# Patient Record
(demographics unavailable — no encounter records)

---

## 2017-02-15 NOTE — EMERGENCY DEPARTMENT REPORT
HPI





- General


Chief Complaint: Dyspnea/Respdistress


Time Seen by Provider: 02/15/17 08:46





- HPI


HPI: 





The patient's 27-year-old female who presents for evaluation of dyspnea.  The 

patient has a history of asthma.  The patient reports progressive and constant 

dyspnea since yesterday morning, 25 hours ago, severe since last night, and is 

exacerbated with lying flat, exertion, or physical activity.  The patient 

denies fever, chest pain, CP, hemoptysis, unilateral leg swelling, oral 

contraceptive use, recent immobilization, history of DVT or PE, hx cancer.








ED Past Medical Hx





- Past Medical History


Hx Hypertension: No


Hx Heart Attack/AMI: No


Hx Congestive Heart Failure: No


Hx Diabetes: Yes (since age 16 ())


Hx Deep Vein Thrombosis: No


Hx Liver Disease: No


Hx Renal Disease: No


Hx Sickle Cell Disease: No


Hx Seizures: No


Hx Asthma: Yes (last attack about 4 months ago)


Hx COPD: No


Hx HIV: No





- Surgical History


Past Surgical History?: Yes


Additional Surgical History: 





- Social History


Smoking Status: Current Every Day Smoker


Substance Use Type: None





- Medications


Home Medications: 


 Home Medications











 Medication  Instructions  Recorded  Confirmed  Last Taken  Type


 


ALBUTEROL Inhaler [ProAir HFA 2 puff IH QID PRN #1 inhalation 02/15/17  Unknown 

Rx





Inhaler]     


 


Prednisone [predniSONE 10 mg 10 mg PO .TAPER #1 tab.ds.pk 02/15/17  Unknown Rx





(6-Day Pack, 21 Tabs)]     














ED Review of Systems


ROS: 


Stated complaint: DIFFICULTY BREATHING


Other details as noted in HPI








Constitutional: denies: fever


ENT: denies: throat or neck pain


Respiratory: reports shortness of breath


Cardiovascular: denies: chest pain


Endocrine: denies unexplained weight loss or gain


Gastrointestinal: denies: abdominal pain, nausea


Genitourinary: denies: dysuria


Musculoskeletal: denies: leg swelling


Skin: denies: rash


Neurological: denies: headache


Hematological/Lymphatic: denies: easy bleeding or easy bruising  


Psych: denies sadness or hopelessness











Physical Exam





- Physical Exam


Vital Signs: 


 Vital Signs











  02/15/17 02/15/17 02/15/17





  00:27 00:51 07:36


 


Temperature 98.4 F  


 


Pulse Rate 103 H  90


 


Pulse Rate [  94 H 





Throughout]   


 


Respiratory 22  18





Rate   


 


Respiratory  20 





Rate [   





Throughout]   


 


Blood Pressure 109/75  


 


Blood Pressure   120/76





[Right]   


 


O2 Sat by Pulse 99  100





Oximetry   














  02/15/17 02/15/17





  08:12 08:15


 


Temperature  


 


Pulse Rate 74 


 


Pulse Rate [  93 H





Throughout]  


 


Respiratory 18 





Rate  


 


Respiratory  18





Rate [  





Throughout]  


 


Blood Pressure  


 


Blood Pressure 110/67 





[Right]  


 


O2 Sat by Pulse 98 





Oximetry  











Physical Exam: 








General: well-nourished, well-developed, no acute distress


Head: Normocephalic, atraumatic


Eyes: normal sclera


ENT: Mucous membranes are pink and moist 


Neck: trachea midline, neck supple, No neck stiffness, no cervical adenopathy


Respiratory: Diminished breath sounds and wheezing present throughout lung 

fields bilaterally, no costal retractions, no respiratory distress


Cardio: S1 and S2 present, no murmurs, rubs, gallops, capillary refill is brisk


Abdomen: Normoactive bowel sounds, soft abdomen, no rigidity, no guarding or 

rebound tenderness


Chest WALL/Back: No tenderness to palpation of the chest wall, no CVA 

tenderness with percussion


Musc: No pitting edema


Skin: No rash


Neuro: no facial drooping, normal speech


Psych: Normal affect








ED Course


 Vital Signs











  02/15/17 02/15/17 02/15/17





  00:27 00:51 07:36


 


Temperature 98.4 F  


 


Pulse Rate 103 H  90


 


Pulse Rate [  94 H 





Throughout]   


 


Respiratory 22  18





Rate   


 


Respiratory  20 





Rate [   





Throughout]   


 


Blood Pressure 109/75  


 


Blood Pressure   120/76





[Right]   


 


O2 Sat by Pulse 99  100





Oximetry   














  02/15/17 02/15/17





  08:12 08:15


 


Temperature  


 


Pulse Rate 74 


 


Pulse Rate [  93 H





Throughout]  


 


Respiratory 18 





Rate  


 


Respiratory  18





Rate [  





Throughout]  


 


Blood Pressure  


 


Blood Pressure 110/67 





[Right]  


 


O2 Sat by Pulse 98 





Oximetry  














ED Medical Decision Making





- Medical Decision Making





The patient was seen and examined by myself.  The patient is placed on a 

cardiac monitor and continuous pulse ox. On initial evaluation, the patient was 

found to be in no distress. Evaluation orders were placed.  The patient is 

given multiple duoneb breathing treatments, IV magnesium, and IV Decadron for 

treatment of asthma.





The patient was reevaluated and reported that their symptoms were improved.  On 

reexamination the patient is found to have normal respiratory rate, O2 sat on 

pulse oximetry, with no costal retractions, and minimal wheezing on 

auscultation.  The patient is stable for discharge with outpatient follow-up.  

The patient is given follow-up and return instructions.  The patient expressed 

understanding and agreed with the plan.  The patient is discharged in stable 

condition.


Critical care attestation.: 


If time is entered above; I have spent that time in minutes in the direct care 

of this critically ill patient, excluding procedure time.








ED Disposition


Clinical Impression: 


 Asthma with acute exacerbation in adult





Disposition: DISCHARGED TO HOME OR SELFCARE


Is pt being admited?: No


Does the pt Need Aspirin: No


Condition: Stable


Instructions:  Asthma (ED)


Prescriptions: 


ALBUTEROL Inhaler [ProAir HFA Inhaler] 2 puff IH QID PRN #1 inhalation


 PRN Reason: Shortness Of Breath


Prednisone [predniSONE 10 mg (6-Day Pack, 21 Tabs)] 10 mg PO .TAPER #1 tab.maría


Referrals: 


PRIMARY CARE,MD [Primary Care Provider] - 3-5 Days


Time of Disposition: 09:33

## 2018-04-27 NOTE — XRAY REPORT
FINAL REPORT



EXAM:  XR CHEST ROUTINE 2V



HISTORY:  Shortness of breath 



TECHNIQUE:  PA and lateral views of the chest were submitted.



FINDINGS:  

The heart size and mediastinum appear normal. The lungs are

clear. Pleural fluid is not seen. The bones soft tissues do not

show any acute changes.



IMPRESSION:  

No active chest disease.

## 2018-04-27 NOTE — EMERGENCY DEPARTMENT REPORT
ED Chest Pain HPI





- General


Chief Complaint: Adult Asthma


Stated Complaint: ELYSIA


Time Seen by Provider: 18 03:02


Source: patient


Mode of arrival: Stretcher


Limitations: No Limitations





- History of Present Illness


Initial Comments: 





Ms. Cordova is a 28-year-old female with history of asthma.  Since Thursday 

morning she has had sharp stabbing chest pain.  Chest pain became worse after 

running from a pit bull in her neighborhood.  Chest pain has been present for a 

total of 12+ hours.  Sharp stabbing central chest pain.  No change with 

movement or inspiration.  No fever.  No wheezing.  No cough.  She uses an 

inhaler without any relief.  Gradual onset of chest pain.  Has been persistent.





No tobacco abuse.  No use of oral contraceptives.





- Related Data


 Previous Rx's











 Medication  Instructions  Recorded  Last Taken  Type


 


ALBUTEROL Inhaler [ProAir HFA 2 puff IH QID PRN #1 inhalation 02/15/17 Unknown 

Rx





Inhaler]    


 


Prednisone [predniSONE 10 mg 10 mg PO .TAPER #1 tab.ds.pk 02/15/17 Unknown Rx





(6-Day Pack, 21 Tabs)]    


 


ALBUTEROL Inhaler [ProAir HFA 2 puff IH QID PRN #1 inhalation 18 Unknown 

Rx





Inhaler]    


 


HYDROcodone/APAP 5-325 [[place] 1 each PO Q6HR PRN #10 tablet 18 Unknown Rx





5/325]    


 


Prednisone [predniSONE 10 mg 10 mg PO .TAPER #1 tab.ds.pk 18 Unknown Rx





(6-Day Pack, 21 Tabs)]    











 Allergies











Allergy/AdvReac Type Severity Reaction Status Date / Time


 


codeine Allergy  Hives Verified 07/25/15 19:38














Heart Score





- HEART Score


History: Slightly suspicious


EKG: Normal


Age: < 45


Risk factors: No known risk factors


Troponin: < normal limit


HEART Score: 0





ED Review of Systems


ROS: 


Stated complaint: ELYSIA


Other details as noted in HPI





Comment: All other systems reviewed and negative


Constitutional: denies: fever, malaise


Respiratory: denies: cough


Cardiovascular: chest pain





ED Past Medical Hx





- Past Medical History


Hx Hypertension: No


Hx Heart Attack/AMI: No


Hx Congestive Heart Failure: No


Hx Diabetes: Yes (since age 16 ())


Hx Deep Vein Thrombosis: No


Hx Liver Disease: No


Hx Renal Disease: No


Hx Sickle Cell Disease: No


Hx Seizures: No


Hx Asthma: Yes (last attack about 4 months ago)


Hx COPD: No


Hx HIV: No





- Surgical History


Additional Surgical History: , Ectopic Pregnancy





- Social History


Smoking Status: Never Smoker


Substance Use Type: None





- Medications


Home Medications: 


 Home Medications











 Medication  Instructions  Recorded  Confirmed  Last Taken  Type


 


ALBUTEROL Inhaler [ProAir HFA 2 puff IH QID PRN #1 inhalation 02/15/17  Unknown 

Rx





Inhaler]     


 


Prednisone [predniSONE 10 mg 10 mg PO .TAPER #1 tab.ds.pk 02/15/17  Unknown Rx





(6-Day Pack, 21 Tabs)]     


 


ALBUTEROL Inhaler [ProAir HFA 2 puff IH QID PRN #1 inhalation 18  Unknown 

Rx





Inhaler]     


 


HYDROcodone/APAP 5-325 [[place] 1 each PO Q6HR PRN #10 tablet 18  Unknown Rx





5/325]     


 


Prednisone [predniSONE 10 mg 10 mg PO .TAPER #1 tab.ds.pk 18  Unknown Rx





(6-Day Pack, 21 Tabs)]     














ED Physical Exam





- General


Limitations: No Limitations


General appearance: alert, in no apparent distress





- Head


Head exam: Present: atraumatic, normocephalic





- Eye


Eye exam: Present: normal appearance





- ENT


ENT exam: Present: mucous membranes moist





- Neck


Neck exam: Present: normal inspection





- Respiratory


Respiratory exam: Present: normal lung sounds bilaterally.  Absent: respiratory 

distress, wheezes, rales, rhonchi





- Cardiovascular


Cardiovascular Exam: Present: regular rate, normal rhythm, normal heart sounds.

  Absent: systolic murmur, diastolic murmur, rubs, gallop





- GI/Abdominal


GI/Abdominal exam: Present: soft, normal bowel sounds.  Absent: distended, 

tenderness, guarding, rebound (patient appears comfortable laying on her left 

side.)





- Extremities Exam


Extremities exam: Present: normal inspection





- Back Exam


Back exam: Present: normal inspection





- Neurological Exam


Neurological exam: Present: alert, oriented X3





- Psychiatric


Psychiatric exam: Present: normal affect, normal mood





- Skin


Skin exam: Present: warm, dry, intact, normal color.  Absent: rash





ED Course





 Vital Signs











  18





  00:23


 


Temperature 97.5 F L


 


Pulse Rate 75


 


Respiratory 18





Rate 


 


Blood Pressure 119/70


 


O2 Sat by Pulse 100





Oximetry 














ED Medical Decision Making





- Lab Data


Result diagrams: 


 18 01:09





 18 01:09








 Laboratory Results - last 24 hr











  18





  01:09 01:09 01:09


 


WBC    7.6


 


RBC    5.23 H


 


Hgb    9.6 L


 


Hct    32.0


 


MCV    64 L


 


MCH    18 L


 


MCHC    29 L


 


RDW    18.7 H


 


Plt Count    245


 


Sodium  140  


 


Potassium  3.6  


 


Chloride  101.3  


 


Carbon Dioxide  23  


 


Anion Gap  19  


 


BUN  14  


 


Creatinine  0.7  


 


Estimated GFR  > 60  


 


BUN/Creatinine Ratio  20  


 


Glucose  104 H  


 


Calcium  9.0  


 


Troponin T  < 0.010  


 


HCG, Qual   Negative 











 Vital Signs - 24 hr











  18





  00:23


 


Temperature 97.5 F L


 


Pulse Rate 75


 


Respiratory 18





Rate 


 


Blood Pressure 119/70


 


O2 Sat by Pulse 100





Oximetry 














- Radiology Data


Radiology results: report reviewed





- Medical Decision Making





Ms. Cordova presents with chest pain shortness of breath.  She is PERC negative.  

No indication of pericarditis.  No indication for pneumothorax.  I suspect 

acute asthma exacerbation is causing pain.  Prescribed albuterol prednisone 

[place].


She was given return precautions.





EKG interpretation


NSR nl rate nl axis nl intervals no ST-T signs of ischemia no ST elevation time 

obtained 2224 rate 80 beats a minute


Critical care attestation.: 


If time is entered above; I have spent that time in minutes in the direct care 

of this critically ill patient, excluding procedure time.








ED Disposition


Clinical Impression: 


 Acute asthma exacerbation, Chest pain





Disposition: - TO HOME OR SELFCARE


Is pt being admited?: No


Does the pt Need Aspirin: No


Condition: Stable


Instructions:  Chest Pain (ED), Asthma (ED)


Prescriptions: 


ALBUTEROL Inhaler [ProAir HFA Inhaler] 2 puff IH QID PRN #1 inhalation


 PRN Reason: Shortness Of Breath


HYDROcodone/APAP 5-325 [[place] 5/325] 1 each PO Q6HR PRN #10 tablet


 PRN Reason: Pain


Prednisone [predniSONE 10 mg (6-Day Pack, 21 Tabs)] 10 mg PO .TAPER #1 tab.ds.pk


Referrals: 


[facility] [Outside] - 3-5 Days


Forms:  Work/School Release Form(ED)


Time of Disposition: 03:29

## 2020-09-01 NOTE — EMERGENCY DEPARTMENT REPORT
ED Chest Pain HPI





- General


Chief Complaint: Chest Pain


Stated Complaint: CP


Time Seen by Provider: 20 06:27


Source: patient, EMS


Mode of arrival: Stretcher


Limitations: No Limitations





- History of Present Illness


Initial Comments: 





This is a 30-year-old -American female presents to the emergency 

department with complaint of midsternal chest tightness and shortness of breath 

that started about 30 minutes to 1 hour prior to arrival.  The patient says that

she was sitting on her bed when the symptoms began.  She denies any fever, 

nausea, vomiting, coughing, wheezing.  The chest tightness is nonradiating.  It 

is worse with inhalation and with touching her chest.  The patient was recently 

diagnosed with a DVT in the right leg and says that she is on Coumadin and has 

been compliant with her medication.  Her primary care physician is Dr. Veras.  

She denies any tobacco or illicit drug use.  She denies any other past medical 

history but appears to have a history of anemia.  She has not taken anything for

symptoms prior to presentation today.





- Related Data


                                Home Medications











 Medication  Instructions  Recorded  Confirmed  Last Taken


 


Insulin NPH Human Isophane 14 units SQ QHS 18 21:15





[Humulin N]    


 


Insulin Regular, Human [Humulin R] 10 units SQ QAMDIAB 18 08:30


 


Pnv No.95/Ferrous Fum/Folic AC 1 tab PO QDAY 18 08:00





[Prenatal Vitamins Tablet]    








                                  Previous Rx's











 Medication  Instructions  Recorded  Last Taken  Type


 


Albuterol Mdi (or & Nicu Only) 2 puff IH QID PRN #1 inhalation 02/15/17 4 Months

Ago Rx





[ProAir HFA Inhaler]   ~18 


 


Ferrous Sulfate [Feosol 325 MG tab] 325 mg PO BID #60 tablet 18 Unknown Rx


 


HYDROcodone/APAP 5-325 [[place] 1 each PO Q6HR PRN #30 tablet 18 Unknown Rx





5/325]    


 


Ibuprofen [Motrin] 800 mg PO Q8HR PRN #30 tablet 18 Unknown Rx


 


Pnv No.95/Ferrous Fum/Folic AC 1 each PO DAILY #30 tablet 18 Unknown Rx





[Prenavite Tablet]    


 


Sennosides/Docusate [Senokot S] 1 each PO Q12HR #14 tab 19 Unknown Rx


 


Apixaban [Eliquis starter pack] 5 mg PO BID #74 tab.ds.pk 20 Unknown Rx











                                    Allergies











Allergy/AdvReac Type Severity Reaction Status Date / Time


 


codeine Allergy  Hives Verified 07/25/15 19:38














Heart Score





- HEART Score


History: Slightly suspicious


EKG: Normal


Age: < 45


Risk factors: 1-2 risk factors


Troponin: < normal limit


HEART Score: 1





- Critical Actions


Critical Actions: 0-3 pts:0.9-1.7%risk of adverse cardiac event.Candidate for 

discharge





ED Review of Systems


ROS: 


Stated complaint: CP


Other details as noted in HPI





Comment: All other systems reviewed and negative


Constitutional: denies: chills, fever


Eyes: denies: eye pain, vision change


ENT: denies: ear pain, throat pain


Respiratory: shortness of breath.  denies: cough


Cardiovascular: chest pain.  denies: palpitations


Gastrointestinal: denies: abdominal pain, vomiting


Genitourinary: denies: dysuria, discharge


Musculoskeletal: denies: back pain, arthralgia


Skin: denies: rash, lesions


Neurological: denies: headache, weakness





ED Past Medical Hx





- Past Medical History


Previous Medical History?: Yes


Hx Hypertension: No


Hx Heart Attack/AMI: No


Hx Congestive Heart Failure: No


Hx Diabetes: Yes


Hx Deep Vein Thrombosis: Yes (right knee)


Hx Liver Disease: No


Hx Renal Disease: No


Hx Sickle Cell Disease: No


Hx Seizures: No


Hx Asthma: Yes (more seasonal)


Hx COPD: No


Hx HIV: No


Additional medical history: anemia





- Surgical History


Past Surgical History?: Yes


Additional Surgical History:  x2.  , Ectopic Pregnancy





- Social History


Smoking Status: Never Smoker


Substance Use Type: None





- Medications


Home Medications: 


                                Home Medications











 Medication  Instructions  Recorded  Confirmed  Last Taken  Type


 


Albuterol Mdi (or & Nicu Only) 2 puff IH QID PRN #1 inhalation 02/15/17 12/25/18

4 Months Ago Rx





[ProAir HFA Inhaler]    ~18 


 


Ferrous Sulfate [Feosol 325 MG tab] 325 mg PO BID #60 tablet 18  Unknown 

Rx


 


HYDROcodone/APAP 5-325 [[place] 1 each PO Q6HR PRN #30 tablet 18  Unknown Rx





5/325]     


 


Ibuprofen [Motrin] 800 mg PO Q8HR PRN #30 tablet 18  Unknown Rx


 


Insulin NPH Human Isophane 14 units SQ QHS 18 21:15 

History





[Humulin N]     


 


Insulin Regular, Human [Humulin R] 10 units SQ QAMDIAB 18 08:30 History


 


Pnv No.95/Ferrous Fum/Folic AC 1 tab PO QDAY 18 08:00 

History





[Prenatal Vitamins Tablet]     


 


Pnv No.95/Ferrous Fum/Folic AC 1 each PO DAILY #30 tablet 18  Unknown Rx





[Prenavite Tablet]     


 


Sennosides/Docusate [Senokot S] 1 each PO Q12HR #14 tab 19  Unknown Rx


 


Apixaban [Eliquis starter pack] 5 mg PO BID #74 tab.ds.pk 20  Unknown Rx














ED Physical Exam





- General


Limitations: No Limitations





- Other


Other exam information: 





GENERAL: The patient is well-developed well-nourished.


HENT: Normocephalic.  Atraumatic.    Patient has moist mucous membranes.


EYES: Extraocular motions are intact.


NECK: Supple. Trachea is midline.


CHEST/LUNGS: Clear to auscultation.  There is no respiratory distress noted.  

There is some reproducible tenderness to palpation to the midsternal chest wall.

 No crepitus or deformity.


HEART/CARDIOVASCULAR: Regular.  There is no tachycardia.  There is no murmur.


ABDOMEN: Abdomen is soft, nontender.  Patient has normal bowel sounds.  Obese 

habitus.


SKIN: Skin is warm and dry. 


NEURO: The patient is awake, alert, and oriented.  The patient is cooperative. 

Normal speech.


MUSCULOSKELETAL: There is no tenderness or deformity.  There is no limitation 

range of motion.  





ED Course


                                   Vital Signs











  20





  05:10 05:15 05:30


 


Temperature 98.2 F 98.6 F 


 


Pulse Rate 96 H 96 H 95 H


 


Respiratory 18 13 15





Rate   


 


Blood Pressure 109/71  105/61


 


Blood Pressure  104/71 





[Left]   


 


O2 Sat by Pulse 99 99 98





Oximetry   














  20





  06:00 06:30 07:00


 


Temperature   


 


Pulse Rate 127 H 80 78


 


Respiratory 14 18 17





Rate   


 


Blood Pressure 102/69 103/55 92/47


 


Blood Pressure   





[Left]   


 


O2 Sat by Pulse 100 96 





Oximetry   














  20





  07:15 07:30


 


Temperature  


 


Pulse Rate 72 77


 


Respiratory 16 17





Rate  


 


Blood Pressure 98/53 95/53


 


Blood Pressure  





[Left]  


 


O2 Sat by Pulse  





Oximetry  














MYRTLE score





- Myrtle Score


Age > 65: (0) No


Aspirin use within the Past 7 Days: (0) No


3 or more CAD Risk Factors: (0) No


2 or more Angina events in past 24 hrs: (1) Yes


Known CAD with more than 50% Stenosis: (0) No


Elevated Cardiac Markers: (0) No


ST Deviation Greater than 0.5mm: (0) No


MYRTLE Score: 1





ED Medical Decision Making





- Lab Data


Result diagrams: 


                                 20 05:29





                                 20 05:29





- EKG Data


-: EKG Interpreted by Me


EKG shows normal: sinus rhythm, axis, intervals, QRS complexes, ST-T waves


Rate: normal





- EKG Data


When compared to previous EKG there are: previous EKG unavailable


Interpretation: normal EKG





- Radiology Data


Radiology results: report reviewed





CTA CHEST WITH CONTRAST INDICATION / CLINICAL INFORMATION: CP, hx of recent DVT,

 not anticoagulated. TECHNIQUE: Axial CT images were obtained through the chest 

after injection of IV contrast. 3 plane MIP and/or 3D reconstructions were 

produced. All CT scans at this location are performed using CT dose reduction 

for ALARA by means of automated exposure control. COMPARISON: Radiograph from 

earlier today. FINDINGS: PULMONARY ARTERIES: No pulmonary emboli. There is 

enlargement of the main pulmonary artery, which measures up to 3.4 cm in 

diameter. THORACIC AORTA: No significant abnormality. HEART: No significant 

abnormality. CORONARY ARTERIES: No significant calcification. MEDIASTINUM / 

MICHAEL: No significant abnormality. PLEURA: No pleural effusion. No pneumothorax. 

LUNGS: No acute air space or interstitial disease. ADDITIONAL FINDINGS: None. 

UPPER ABDOMEN: No acute findings. SKELETAL STRUCTURES: No significant osseous 

abnormality. IMPRESSION: 1. No CT evidence for pulmonary embolism. 2. 

Enlargement of the main pulmonary artery, measuring up to 3.4 cm in diameter. 3.

 No acute findings. 





- Medical Decision Making





This patient presented to the emergency department with complaint of shortness 

of breath and some chest discomfort this morning while sitting on her bed.  On 

examination heart and lungs are normal to auscultation and the patient does not 

appear in any respiratory or acute distress.  EKG did not have any morphology 

consistent with ST elevation MI or any dysrhythmia.  Chest x-ray did not show 

any pneumonia, pleural effusions, pneumothorax, focal consolidation, or any 

other acute process.  Patient's labs have been unremarkable including CBC, 

metabolic panel and negative troponins x2.  However the patient is allegedly on 

Coumadin but has an INR of 1.  With her recent diagnosis of DVT, the 

subtherapeutic INR, and current complaints of chest pain and shortness of 

breath, a CT angiography of the chest was performed.  It resulted as negative 

for any pulmonary embolism or any other acute process.  The patient was started 

on Eliquis instead of the Coumadin.  She was given a coupon to make this 

medication cheaper for her for the first month.  She was instructed to 

discontinue the Coumadin.  She will follow-up with her primary care physician 

regarding anticoagulation and for general follow-up.  Her contact information 

has been sent over to the [facility], and someone from 

their office should be contacting her shortly for close outpatient follow-up as 

per our hospitals low risk chest pain protocol.


Critical Care Time: No


Critical care attestation.: 


If time is entered above; I have spent that time in minutes in the direct care 

of this critically ill patient, excluding procedure time.








ED Disposition


Clinical Impression: 


 Atypical chest pain, History of DVT (deep vein thrombosis), Subtherapeutic 

international normalized ratio (INR)





Disposition: DC- TO HOME OR SELFCARE


Is pt being admited?: No


Condition: Stable


Instructions:  Apixaban (By mouth), Chest Pain (ED)


Additional Instructions: 


Please follow-up with your primary care physician in the next few days regarding

 follow-up for your chest pain and regarding your anticoagulation.  Since your 

Coumadin level was essentially negative despite alleged compliance, I am 

starting you on a different medication for anticoagulation for treatment of your

 recent DVT.  This medication is taken at 10 mg by mouth twice daily for 7 days 

and then you begin taking 5 mg by mouth twice daily.  I have given you a coupon 

to bring to the pharmacy to make this medication cheaper for you.  With the 

Eliquis, as with Coumadin, this is a blood thinner and therefore you have 

increased risk of bleeding.  Please try to take any possible precautions.  Stop 

taking the Coumadin as you do not want to take both of these medications.





I am sending your contact information to [facility], and 

someone from their office should be contacting you shortly for close outpatient 

follow-up regarding your chest pain.





Return to the emergency department with any worsening of your symptoms or with 

any acute distress.


Prescriptions: 


Apixaban [Eliquis starter pack] 5 mg PO BID #74 tab.ds.pk


Referrals: 


[facility] [Provider Group] - 2-3 Days


[facility] [Primary Care Provider] - 2-3 Days


Time of Disposition: 09:54

## 2020-09-01 NOTE — XRAY REPORT
CHEST 2 VIEWS 



INDICATION / CLINICAL INFORMATION:

Chest Pain.



COMPARISON: 

Chest x-ray 9/1/2020



FINDINGS:



SUPPORT DEVICES: None.

HEART / MEDIASTINUM: No significant abnormality. 

LUNGS / PLEURA: No significant pulmonary or pleural abnormality. No pneumothorax. 



ADDITIONAL FINDINGS: No significant additional findings.



IMPRESSION:

1. No acute findings.



Signer Name: Sina Simpson MD 

Signed: 9/1/2020 9:49 PM

Workstation Name: [id number]

## 2020-09-01 NOTE — CAT SCAN REPORT
CTA CHEST WITH CONTRAST



INDICATION / CLINICAL INFORMATION:

CP, hx of recent DVT, not anticoagulated.



TECHNIQUE:

Axial CT images were obtained through the chest after injection of IV contrast. 3 plane MIP and/or 3D
 reconstructions were produced. All CT scans at this location are performed using CT dose reduction f
or ALARA by means of automated exposure control. 



COMPARISON:

Radiograph from earlier today.



FINDINGS:

PULMONARY ARTERIES: No pulmonary emboli. There is enlargement of the main pulmonary artery, which ary
sures up to 3.4 cm in diameter.

THORACIC AORTA: No significant abnormality. 

HEART: No significant abnormality.

CORONARY ARTERIES: No significant calcification.

MEDIASTINUM / MICHAEL: No significant abnormality.

PLEURA: No pleural effusion. No pneumothorax.

LUNGS: No acute air space or interstitial disease. 



ADDITIONAL FINDINGS: None.



UPPER ABDOMEN: No acute findings.



SKELETAL STRUCTURES: No significant osseous abnormality.



IMPRESSION:

1. No CT evidence for pulmonary embolism. 

2. Enlargement of the main pulmonary artery, measuring up to 3.4 cm in diameter.

3. No acute findings.



Signer Name: Macario Hurtado MD 

Signed: 9/1/2020 9:27 AM

Workstation Name: [id number]

## 2020-09-01 NOTE — XRAY REPORT
CHEST 1 VIEW 



INDICATION: 

Chest Pain.



COMPARISON: 

4/27/2018.



FINDINGS:

Support devices: None.



Heart: Mild cardiac enlargement. 

Lungs/Pleura: No acute air space or interstitial disease. 



Additional findings: None.



IMPRESSION: Mild cardiac enlargement.



Signer Name: Octavio Root MD 

Signed: 9/1/2020 7:00 AM

Workstation Name: [id number]

## 2020-09-01 NOTE — EMERGENCY DEPARTMENT REPORT
ED General Adult HPI





- General


Chief complaint: Chest Pain


Stated complaint: CHEST PAIN


Time Seen by Provider: 20 21:35


Source: patient


Mode of arrival: Wheelchair


Limitations: No Limitations





- History of Present Illness


Initial comments: 





The patient presents to the emergency department the chief complaint of chest 

pain that started yesterday.  Patient states she presented to this hospital 

yesterday was evaluated for chest pain and was sent home with Eliquis for DVT 

that was located in her left leg.  Patient states that she was diagnosed with a 

DVT a week ago at [facility] and was started on Xarelto.  Patient 

does endorse shortness of breath but denies abdominal pain.  Patient states she 

was given Eliquis tablet this morning before being discharged.  The patient 

states the chest pain is so intense she nearly passed out.


-: Sudden


Location: chest


Radiation: non-radiation


Severity scale (0 -10): 5


Quality: aching


Consistency: constant


Improves with: none


Worsens with: none


Associated Symptoms: denies other symptoms


Treatments Prior to Arrival: none





- Related Data


                                Home Medications











 Medication  Instructions  Recorded  Confirmed  Last Taken


 


Insulin NPH Human Isophane 14 units SQ QHS 18 21:15





[Humulin N]    


 


Insulin Regular, Human [Humulin R] 10 units SQ QAMDIAB 18 08:30


 


Pnv No.95/Ferrous Fum/Folic AC 1 tab PO QDAY 18 08:00





[Prenatal Vitamins Tablet]    








                                  Previous Rx's











 Medication  Instructions  Recorded  Last Taken  Type


 


Albuterol Mdi (or & Nicu Only) 2 puff IH QID PRN #1 inhalation 02/15/17 4 Months

Ago Rx





[ProAir HFA Inhaler]   ~18 


 


Ferrous Sulfate [Feosol 325 MG tab] 325 mg PO BID #60 tablet 18 Unknown Rx


 


HYDROcodone/APAP 5-325 [[place] 1 each PO Q6HR PRN #30 tablet 18 Unknown Rx





5/325]    


 


Ibuprofen [Motrin] 800 mg PO Q8HR PRN #30 tablet 18 Unknown Rx


 


Pnv No.95/Ferrous Fum/Folic AC 1 each PO DAILY #30 tablet 18 Unknown Rx





[Prenavite Tablet]    


 


Sennosides/Docusate [Senokot S] 1 each PO Q12HR #14 tab 19 Unknown Rx


 


Apixaban [Eliquis starter pack] 5 mg PO BID #74 tab.ds.pk 20 Unknown Rx


 


Albuterol Mdi (or & Nicu Only) 2 puff IH Q4HR PRN #1 inhalation 20 Unknown

Rx





[ProAir HFA Inhaler]    


 


HYDROcodone/APAP 5-325 [[place] 1 each PO Q6HR PRN #12 tablet 20 Unknown Rx





5/325]    


 


Ibuprofen [Motrin] 800 mg PO Q8HR PRN #30 tablet 20 Unknown Rx











                                    Allergies











Allergy/AdvReac Type Severity Reaction Status Date / Time


 


codeine Allergy  Hives Verified 07/25/15 19:38














ED Review of Systems


ROS: 


Stated complaint: CHEST PAIN


Other details as noted in HPI





Constitutional: denies: chills, fever


Eyes: denies: eye pain, eye discharge, vision change


ENT: denies: ear pain, throat pain


Respiratory: denies: cough, shortness of breath, wheezing


Cardiovascular: chest pain.  denies: palpitations


Endocrine: no symptoms reported


Gastrointestinal: denies: abdominal pain, nausea, diarrhea


Genitourinary: denies: urgency, dysuria, discharge


Musculoskeletal: denies: back pain, joint swelling, arthralgia


Skin: denies: rash, lesions


Neurological: denies: headache, weakness, paresthesias


Psychiatric: denies: anxiety, depression


Hematological/Lymphatic: denies: easy bleeding, easy bruising





ED Past Medical Hx





- Past Medical History


Hx Hypertension: No


Hx Heart Attack/AMI: No


Hx Congestive Heart Failure: No


Hx Diabetes: Yes


Hx Deep Vein Thrombosis: Yes (right knee)


Hx Liver Disease: No


Hx Renal Disease: No


Hx Sickle Cell Disease: No


Hx Seizures: No


Hx Asthma: Yes (more seasonal)


Hx COPD: No


Hx HIV: No


Additional medical history: anemia





- Surgical History


Additional Surgical History:  x2.  , Ectopic Pregnancy





- Social History


Smoking Status: Never Smoker


Substance Use Type: None





- Medications


Home Medications: 


                                Home Medications











 Medication  Instructions  Recorded  Confirmed  Last Taken  Type


 


Albuterol Mdi (or & Nicu Only) 2 puff IH QID PRN #1 inhalation 02/15/17 12/25/18

 4 Months Ago Rx





[ProAir HFA Inhaler]    ~18 


 


Ferrous Sulfate [Feosol 325 MG tab] 325 mg PO BID #60 tablet 18  Unknown 

Rx


 


HYDROcodone/APAP 5-325 [[place] 1 each PO Q6HR PRN #30 tablet 18  Unknown Rx





5/325]     


 


Ibuprofen [Motrin] 800 mg PO Q8HR PRN #30 tablet 18  Unknown Rx


 


Insulin NPH Human Isophane 14 units SQ QHS 18 21:15 

History





[Humulin N]     


 


Insulin Regular, Human [Humulin R] 10 units SQ QAMDIAB 18 08:30 History


 


Pnv No.95/Ferrous Fum/Folic AC 1 tab PO QDAY 18 08:00 

History





[Prenatal Vitamins Tablet]     


 


Pnv No.95/Ferrous Fum/Folic AC 1 each PO DAILY #30 tablet 18  Unknown Rx





[Prenavite Tablet]     


 


Sennosides/Docusate [Senokot S] 1 each PO Q12HR #14 tab 19  Unknown Rx


 


Apixaban [Eliquis starter pack] 5 mg PO BID #74 tab.ds.pk 20  Unknown Rx


 


Albuterol Mdi (or & Nicu Only) 2 puff IH Q4HR PRN #1 inhalation 20  

Unknown Rx





[ProAir HFA Inhaler]     


 


HYDROcodone/APAP 5-325 [[place] 1 each PO Q6HR PRN #12 tablet 20  Unknown Rx





5/325]     


 


Ibuprofen [Motrin] 800 mg PO Q8HR PRN #30 tablet 20  Unknown Rx














ED Physical Exam





- General


Limitations: No Limitations


General appearance: alert, in no apparent distress





- Head


Head exam: Present: atraumatic, normocephalic





- Eye


Eye exam: Present: normal appearance, PERRL, EOMI





- ENT


ENT exam: Present: mucous membranes moist





- Neck


Neck exam: Present: normal inspection





- Respiratory


Respiratory exam: Present: normal lung sounds bilaterally, chest wall 

tenderness.  Absent: respiratory distress





- Cardiovascular


Cardiovascular Exam: Present: regular rate, normal rhythm.  Absent: systolic 

murmur, diastolic murmur, rubs, gallop





- GI/Abdominal


GI/Abdominal exam: Present: soft, normal bowel sounds.  Absent: distended, 

tenderness





- Extremities Exam


Extremities exam: Present: normal inspection





- Back Exam


Back exam: Present: normal inspection





- Neurological Exam


Neurological exam: Present: alert, oriented X3, CN II-XII intact.  Absent: motor

 sensory deficit





- Psychiatric


Psychiatric exam: Present: normal affect, normal mood





- Skin


Skin exam: Present: warm, dry, intact, normal color.  Absent: rash





ED Course


                                   Vital Signs











  20





  20:33 22:00 22:30


 


Temperature 98.3 F 98.5 F 


 


Pulse Rate 101 H 86 83


 


Respiratory 18 14 15





Rate   


 


Blood Pressure 126/84  107/66


 


Blood Pressure  103/75 





[Left]   


 


O2 Sat by Pulse 100 100 100





Oximetry   














  20





  22:45 23:00 23:15


 


Temperature   


 


Pulse Rate 82 97 H 88


 


Respiratory 12 12 12





Rate   


 


Blood Pressure 115/76 115/87 127/77


 


Blood Pressure   





[Left]   


 


O2 Sat by Pulse 99  98





Oximetry   














  20





  23:30 00:21


 


Temperature  


 


Pulse Rate 83 


 


Respiratory 15 18





Rate  


 


Blood Pressure 115/73 


 


Blood Pressure  





[Left]  


 


O2 Sat by Pulse 99 





Oximetry  














ED Medical Decision Making





- Lab Data


Result diagrams: 


                                 20 21:40





                                 20 21:40








                                   Lab Results











  20 Range/Units





  21:40 21:40 


 


WBC  7.4   (4.5-11.0)  K/mm3


 


RBC  4.77   (3.65-5.03)  M/mm3


 


Hgb  8.5 L   (10.1-14.3)  gm/dl


 


Hct  30.9  D   (30.3-42.9)  %


 


MCV  65 L   (79-97)  fl


 


MCH  18 L   (28-32)  pg


 


MCHC  28 L   (30-34)  %


 


RDW  20.3 H   (13.2-15.2)  %


 


Plt Count  306   (140-440)  K/mm3


 


Add Manual Diff  Complete   


 


Total Counted  100   


 


Seg Neuts % (Manual)  49.0   (40.0-70.0)  %


 


Band Neutrophils %  0   %


 


Lymphocytes % (Manual)  41.0 H   (13.4-35.0)  %


 


Reactive Lymphs % (Man)  0   %


 


Monocytes % (Manual)  9.0 H   (0.0-7.3)  %


 


Eosinophils % (Manual)  1.0   (0.0-4.3)  %


 


Basophils % (Manual)  0   (0.0-1.8)  %


 


Metamyelocytes %  0   %


 


Myelocytes %  0   %


 


Promyelocytes %  0   %


 


Blast Cells %  0   %


 


Nucleated RBC %  Not Reportable   


 


Seg Neutrophils # Man  3.6   (1.8-7.7)  K/mm3


 


Band Neutrophils #  0.0   K/mm3


 


Lymphocytes # (Manual)  3.0   (1.2-5.4)  K/mm3


 


Abs React Lymphs (Man)  0.0   K/mm3


 


Monocytes # (Manual)  0.7   (0.0-0.8)  K/mm3


 


Eosinophils # (Manual)  0.1   (0.0-0.4)  K/mm3


 


Basophils # (Manual)  0.0   (0.0-0.1)  K/mm3


 


Metamyelocytes #  0.0   K/mm3


 


Myelocytes #  0.0   K/mm3


 


Promyelocytes #  0.0   K/mm3


 


Blast Cells #  0.0   K/mm3


 


WBC Morphology  Not Reportable   


 


Hypersegmented Neuts  Not Reportable   


 


Hyposegmented Neuts  Not Reportable   


 


Hypogranular Neuts  Not Reportable   


 


Smudge Cells  Not Reportable   


 


Toxic Granulation  Not Reportable   


 


Toxic Vacuolation  Not Reportable   


 


Dohle Bodies  Not Reportable   


 


Pelger-Huet Anomaly  Not Reportable   


 


Geetha Rods  Not Reportable   


 


Platelet Estimate  Consistent w auto   


 


Clumped Platelets  Not Reportable   


 


Plt Clumps, EDTA  Not Reportable   


 


Large Platelets  Not Reportable   


 


Giant Platelets  Not Reportable   


 


Platelet Satelliting  Not Reportable   


 


Plt Morphology Comment  Not Reportable   


 


RBC Morphology  Not Reportable   


 


Dimorphic RBCs  Not Reportable   


 


Polychromasia  Not Reportable   


 


Hypochromasia  1+   


 


Poikilocytosis  Not Reportable   


 


Anisocytosis  2+   


 


Microcytosis  Not Reportable   


 


Macrocytosis  Not Reportable   


 


Spherocytes  Not Reportable   


 


Pappenheimer Bodies  Not Reportable   


 


Sickle Cells  Not Reportable   


 


Target Cells  Not Reportable   


 


Tear Drop Cells  Few   


 


Ovalocytes  1+   


 


Helmet Cells  Not Reportable   


 


Yoon-Tiburones Bodies  Not Reportable   


 


Cabot Rings  Not Reportable   


 


Pike Cells  Not Reportable   


 


Bite Cells  Not Reportable   


 


Crenated Cell  Not Reportable   


 


Elliptocytes  Few   


 


Acanthocytes (Spur)  Not Reportable   


 


Rouleaux  Not Reportable   


 


Hemoglobin C Crystals  Not Reportable   


 


Schistocytes  Not Reportable   


 


Malaria parasites  Not Reportable   


 


Vamshi Bodies  Not Reportable   


 


Hem Pathologist Commnt  No   


 


Sodium   143  (137-145)  mmol/L


 


Potassium   3.6  (3.6-5.0)  mmol/L


 


Chloride   107.6 H  ()  mmol/L


 


Carbon Dioxide   21 L  (22-30)  mmol/L


 


Anion Gap   18  mmol/L


 


BUN   7  (7-17)  mg/dL


 


Creatinine   1.0  (0.6-1.2)  mg/dL


 


Estimated GFR   > 60  ml/min


 


BUN/Creatinine Ratio   7  %


 


Glucose   91  ()  mg/dL


 


Calcium   8.6  (8.4-10.2)  mg/dL


 


Troponin T   < 0.010  (0.00-0.029)  ng/mL














- EKG Data


-: EKG Interpreted by Me


EKG shows normal: sinus rhythm


Rate: normal





- Radiology Data


Radiology results: report reviewed





- Medical Decision Making





Reviewed the patient's CTA of her chest that was done earlier today which showed

 no pulmonary emboli or acute infectious process


Discussed results with patient 


Critical care attestation.: 


If time is entered above; I have spent that time in minutes in the direct care 

of this critically ill patient, excluding procedure time.








ED Disposition


Clinical Impression: 


 Pleurisy, Nonspecific chest pain





Disposition: DC-01 TO HOME OR SELFCARE


Is pt being admited?: No


Does the pt Need Aspirin: No


Condition: Stable


Instructions:  Chest Pain (ED), Pleurisy (ED)


Additional Instructions: 


return if worse


Referrals: 


PRIMARY MD FABIANO [Primary Care Provider] - 3-5 Days


ARSEN SHANKAR MD [Staff Physician] - 3-5 Days


Time of Disposition: 00:58

## 2020-10-07 NOTE — XRAY REPORT
LEFT KNEE 3 VIEWS



INDICATION / CLINICAL INFORMATION:

INJURY - PAIN.



COMPARISON:

None available.



FINDINGS:

No fracture or other skeletal abnormality. No significant joint effusion or hemarthrosis.



 



Signer Name: Vinh Cruz MD 

Signed: 10/7/2020 5:42 AM

Workstation Name: [id number]

## 2020-10-07 NOTE — EMERGENCY DEPARTMENT REPORT
ED Lower Extremity HPI





- General


Chief Complaint: Extremity Injury, Lower


Stated Complaint: LEFT LEG PAIN


Source: patient


Mode of arrival: Wheelchair


Limitations: No Limitations





- History of Present Illness


Initial Comments: 





Patient is a 31-year-old -American female with a history of morbid 

obesity, asthma, DVT and non-insulin-dependent diabetes who presents to the ED 

with complaint of acute onset worsening left knee pain after her 12-year-old son

slipped and fell onto the left knee about 8 hours ago.  Patient states that she 

had similar injury on the left knee about 3 weeks ago and was improving after 

being evaluated at another hospital but after this incident 8 hours ago the pain

got worse.  Patient states the pain is worse with any ambulation or palpation of

the left knee.  Patient denies dizziness, syncope, chest pain, shortness of 

breath, numbness and tingling or weakness of left leg, headache, back pain, or 

abdominal pain, fall, heavy lifting or traumatic injury.


MD Complaint: knee injury (pain)


-: Sudden, hour(s) (8)


Injury: Knee: Left (pain)


Type of Injury: blunt


Place: home


Severity: severe


Severity scale (0 -10): 7


Improves With: nothing


Context: direct blow


Associated Symptoms: able to partially bear weight.  denies: snap/pop sensation,

swelling, numbness, tingling, unable to bear weight





- Related Data


                                Home Medications











 Medication  Instructions  Recorded  Confirmed  Last Taken


 


Insulin NPH Human Isophane 14 units SQ QHS 18 21:15





[Humulin N]    


 


Insulin Regular, Human [Humulin R] 10 units SQ QAMDIAB 18 08:30


 


Pnv No.95/Ferrous Fum/Folic AC 1 tab PO QDAY 18 08:00





[Prenatal Vitamins Tablet]    








                                  Previous Rx's











 Medication  Instructions  Recorded  Last Taken  Type


 


Albuterol Mdi (or & Nicu Only) 2 puff IH QID PRN #1 inhalation 02/15/17 4 Months

Ago Rx





[ProAir HFA Inhaler]   ~18 


 


Ferrous Sulfate [Feosol 325 MG tab] 325 mg PO BID #60 tablet 18 Unknown Rx


 


HYDROcodone/APAP 5-325 [[place] 1 each PO Q6HR PRN #30 tablet 18 Unknown Rx





5/325]    


 


Ibuprofen [Motrin] 800 mg PO Q8HR PRN #30 tablet 18 Unknown Rx


 


Pnv No.95/Ferrous Fum/Folic AC 1 each PO DAILY #30 tablet 18 Unknown Rx





[Prenavite Tablet]    


 


Sennosides/Docusate [Senokot S] 1 each PO Q12HR #14 tab 19 Unknown Rx


 


Apixaban [Eliquis starter pack] 5 mg PO BID #74 tab.ds.pk 20 Unknown Rx


 


Albuterol Mdi (or & Nicu Only) 2 puff IH Q4HR PRN #1 inhalation 20 Unknown

Rx





[ProAir HFA Inhaler]    


 


HYDROcodone/APAP 5-325 [[place] 1 each PO Q6HR PRN #12 tablet 20 Unknown Rx





5/325]    


 


Ibuprofen [Motrin] 800 mg PO Q8HR PRN #30 tablet 20 Unknown Rx


 


Ibuprofen [Motrin] 800 mg PO Q8HR PRN #30 tablet 10/07/20 Unknown Rx


 


Methocarbamol [Robaxin] 500 mg PO Q8H PRN #24 tablet 10/07/20 Unknown Rx


 


traMADoL [Ultram] 50 mg PO Q6HR PRN #12 tablet 10/07/20 Unknown Rx











                                    Allergies











Allergy/AdvReac Type Severity Reaction Status Date / Time


 


codeine Allergy  Hives Verified 07/25/15 19:38














ED Review of Systems


ROS: 


Stated complaint: LEFT LEG PAIN


Other details as noted in HPI





Constitutional: denies: chills, fever


Eyes: denies: eye pain, eye discharge, vision change


ENT: denies: ear pain, throat pain


Respiratory: denies: cough, shortness of breath, wheezing


Cardiovascular: denies: chest pain, palpitations


Endocrine: no symptoms reported


Gastrointestinal: denies: abdominal pain, nausea, diarrhea


Genitourinary: denies: urgency, dysuria, discharge


Musculoskeletal: arthralgia (Left knee pain).  denies: back pain, joint swelling


Skin: denies: rash, lesions


Neurological: denies: headache, weakness, paresthesias


Psychiatric: denies: anxiety, depression


Hematological/Lymphatic: denies: easy bleeding, easy bruising





ED Past Medical Hx





- Past Medical History


Hx Hypertension: No


Hx Heart Attack/AMI: No


Hx Congestive Heart Failure: No


Hx Diabetes: Yes


Hx Deep Vein Thrombosis: Yes (right knee)


Hx Liver Disease: No


Hx Renal Disease: No


Hx Sickle Cell Disease: No


Hx Seizures: No


Hx Asthma: Yes (more seasonal)


Hx COPD: No


Hx HIV: No


Additional medical history: anemia





- Surgical History


Additional Surgical History:  x2.  , Ectopic Pregnancy





- Social History


Smoking Status: Never Smoker


Substance Use Type: None





- Medications


Home Medications: 


                                Home Medications











 Medication  Instructions  Recorded  Confirmed  Last Taken  Type


 


Albuterol Mdi (or & Nicu Only) 2 puff IH QID PRN #1 inhalation 02/15/17 12/25/18

 4 Months Ago Rx





[ProAir HFA Inhaler]    ~18 


 


Ferrous Sulfate [Feosol 325 MG tab] 325 mg PO BID #60 tablet 18  Unknown 

Rx


 


HYDROcodone/APAP 5-325 [[place] 1 each PO Q6HR PRN #30 tablet 18  Unknown Rx





5/325]     


 


Ibuprofen [Motrin] 800 mg PO Q8HR PRN #30 tablet 18  Unknown Rx


 


Insulin NPH Human Isophane 14 units SQ QHS 18 21:15 

History





[Humulin N]     


 


Insulin Regular, Human [Humulin R] 10 units SQ QAMDIAB 18 08:30 History


 


Pnv No.95/Ferrous Fum/Folic AC 1 tab PO QDAY 18 08:00 

History





[Prenatal Vitamins Tablet]     


 


Pnv No.95/Ferrous Fum/Folic AC 1 each PO DAILY #30 tablet 18  Unknown Rx





[Prenavite Tablet]     


 


Sennosides/Docusate [Senokot S] 1 each PO Q12HR #14 tab 19  Unknown Rx


 


Apixaban [Eliquis starter pack] 5 mg PO BID #74 tab.ds.pk 20  Unknown Rx


 


Albuterol Mdi (or & Nicu Only) 2 puff IH Q4HR PRN #1 inhalation 20  

Unknown Rx





[ProAir HFA Inhaler]     


 


HYDROcodone/APAP 5-325 [[place] 1 each PO Q6HR PRN #12 tablet 20  Unknown Rx





5/325]     


 


Ibuprofen [Motrin] 800 mg PO Q8HR PRN #30 tablet 20  Unknown Rx


 


Ibuprofen [Motrin] 800 mg PO Q8HR PRN #30 tablet 10/07/20  Unknown Rx


 


Methocarbamol [Robaxin] 500 mg PO Q8H PRN #24 tablet 10/07/20  Unknown Rx


 


traMADoL [Ultram] 50 mg PO Q6HR PRN #12 tablet 10/07/20  Unknown Rx














ED Physical Exam





- General


Limitations: No Limitations


General appearance: alert, in no apparent distress, obese





- Head


Head exam: Present: atraumatic, normocephalic, normal inspection





- Eye


Eye exam: Present: normal appearance, PERRL, EOMI


Pupils: Present: normal accommodation





- ENT


ENT exam: Present: normal exam, normal orophraynx, mucous membranes moist, TM's 

normal bilaterally, normal external ear exam





- Neck


Neck exam: Present: normal inspection, full ROM.  Absent: tenderness





- Respiratory


Respiratory exam: Present: normal lung sounds bilaterally.  Absent: respiratory 

distress, wheezes, rales, rhonchi, chest wall tenderness, accessory muscle use, 

decreased breath sounds





- Cardiovascular


Cardiovascular Exam: Present: regular rate, normal rhythm, normal heart sounds. 

 Absent: systolic murmur, diastolic murmur, rubs, gallop





- GI/Abdominal


GI/Abdominal exam: Present: soft, normal bowel sounds.  Absent: tenderness, 

guarding, hyperactive bowel sounds, hypoactive bowel sounds, organomegaly





- Extremities Exam


Extremities exam: Present: normal inspection, full ROM, tenderness (Palpable 

left knee tenderness with limited range of motion due to pain), normal capillary

 refill.  Absent: pedal edema, joint swelling, calf tenderness





- Back Exam


Back exam: Present: normal inspection, full ROM.  Absent: tenderness, CVA 

tenderness (R), CVA tenderness (L), muscle spasm, paraspinal tenderness, 

vertebral tenderness





- Neurological Exam


Neurological exam: Present: alert, oriented X3, CN II-XII intact, normal gait, 

reflexes normal





- Psychiatric


Psychiatric exam: Present: normal affect, normal mood





- Skin


Skin exam: Present: warm, dry, intact, normal color.  Absent: rash





ED Course





                                   Vital Signs











  10/07/20





  04:02


 


Temperature 98.3 F


 


Pulse Rate 90


 


Respiratory 18





Rate 


 


Blood Pressure 98/61


 


O2 Sat by Pulse 98





Oximetry 














ED Lower Extremity MDM





- Radiology Data


Radiology results: report reviewed, image reviewed





Findings





[facility] 


[address] [place] [address] 





XRay Report 


Signed 





 Patient: MICHAEL CAICEDO MR#: 


 [id number] 


 : 1989 Acct:[id number] 





 Age/Sex: 31 / F ADM Date: 10/07/20 





 Loc: ED 


 Attending Dr: 








 Ordering Physician: MARAL BUENO 


 Date of Service: 10/07/20 


 Procedure(s): XR knee 3V LT 


 Accession Number(s): [id number] 





 cc: MARAL BUENO 





 Fluoro Time In Minutes: 





 LEFT KNEE 3 VIEWS 





INDICATION / CLINICAL INFORMATION: 


INJURY - PAIN. 





COMPARISON: 


None available. 





FINDINGS: 


No fracture or other skeletal abnormality. No significant joint effusion or 

hemarthrosis. 











Signer Name: Vinh Cruz MD 


Signed: 10/7/2020 5:42 AM 


Workstation Name: [id number] 








 Transcribed By: TM 


 Dictated By: Vinh Cruz MD 


 Electronically Authenticated By: Vinh Cruz MD 


 Signed Date/Time: 10/07/20 0542 











 DD/DT: 10/07/20 0541 


 TD/TT: 





- Medical Decision Making





This is a 31-year-old -American female with a history of morbid obesity, 

asthma, DVT and non-insulin-dependent diabetes who presents to the ED with 

complaint of acute onset worsening left knee pain after her 12-year-old son 

slipped and fell onto the left knee about 8 hours ago.  Patient states that she 

had similar injury on the left knee about 3 weeks ago and was improving after 

being evaluated at another hospital but after this incident 8 hours ago the pain

 got worse.  Patient states the pain is worse with any ambulation or palpation 

of the left knee.  In the ED, patient is alert and oriented x3 and is not in 

distress but appears to be in pain.  Patient was treated for pain in the ED and 

left knee x-ray shows no acute fractures or subluxations.  Patient left knee was

 splinted with Ace wrap and the patient was discharged home on pain medications 

and muscle relaxants and was advised to follow-up with her primary care 

physician in 5 to 7 days for reevaluation or return to the ED immediately if 

symptoms get worse.





- Differential Diagnosis


Knee sprain; muscle strain; contusion; knee fracture


Critical care attestation.: 


If time is entered above; I have spent that time in minutes in the direct care 

of this critically ill patient, excluding procedure time.








ED Disposition


Clinical Impression: 


Left knee sprain


Qualifiers:


 Encounter type: initial encounter Involved ligament of knee: unspecified 

ligament Qualified Code(s): S83.92XA - Sprain of unspecified site of left knee, 

initial encounter





Contusion of left knee and lower leg


Qualifiers:


 Encounter type: initial encounter Qualified Code(s): S80.02XA - Contusion of 

left knee, initial encounter; S80.12XA - Contusion of left lower leg, initial 

encounter





Muscle strain of left knee


Qualifiers:


 Encounter type: initial encounter Qualified Code(s): S86.912A - Strain of 

unspecified muscle(s) and tendon(s) at lower leg level, left leg, initial 

encounter





Disposition:  TO HOME OR SELFCARE


Is pt being admited?: No


Does the pt Need Aspirin: No


Condition: Stable


Instructions:  Muscle Strain (ED), Leg Sprain (ED), Knee Sprain (ED)


Additional Instructions: 


The left knee x-ray showed no acute fractures or subluxations.  Take medication 

with food, drink plenty of fluids and follow-up with your primary care physician

 in 5 to 7 days for reevaluation.  Return to the ED immediately if symptoms get 

worse.


Prescriptions: 


Ibuprofen [Motrin] 800 mg PO Q8HR PRN #30 tablet


 PRN Reason: Pain , Severe (7-10)


Methocarbamol [Robaxin] 500 mg PO Q8H PRN #24 tablet


 PRN Reason: Muscle Spasm


traMADoL [Ultram] 50 mg PO Q6HR PRN #12 tablet


 PRN Reason: Pain


Referrals: 


[facility] [Provider Group] - 3-5 Days


Time of Disposition: 05:59


Print Language: ENGLISH

## 2020-10-31 NOTE — EMERGENCY DEPARTMENT REPORT
Chief Complaint: Urogenital-Female


Stated Complaint: POSS STD





- HPI


History of Present Illness: 





The patient was evaluated in the emergency department for symptoms described in 

the history of present illness.  He/she was evaluated in the context of the 

global COVID-19 pandemic, which necessitated consideration that the patient 

might be at risk for infection with the virus that causes COVID-19.  

Institutional protocols and algorithms that pertain to the evaluation of 

patients at risk for COVID-19 are in a state of rapid change based on 

information released by regulatory bodies including the CDC and federal and 

state organizations.  These policies and algorithms were followed during the pat

ient's care in the emergency department.  Please note that these policies, 

procedures and recommendations changed on a rapid basis.














31-year-old -American female presents to the emergency room requesting 

treatment for STD exposure.  Patient states that her  informed her this 

he had contracted chlamydia and gonorrhea outside of their marriage.  Patient 

comes in for evaluation and treatment.  Patient denies any abdominal pain no 

vaginal discharge reports she is currently on her menses.  Denies any fever 

chills no nausea no vomiting or pelvic pain.





- Exam


Physical Exam: 





Gen: alert oriented NAD





Cardic: regular rate and rhythm no murmurs appreciated





Resp: Clear to auscultation bilateral no wheezing no rales or rhonchi.





Abdomen: Soft nontender nondistended normal bowel sounds.





Mini neuro: Normal finger to nose exam, heel-to-shin normal, Romberg neg, 

strengh 4/5 all 


extrimities, Alert and oriented time 3  Crainal nerve II-IIX intact


MSE screening note: 


Focused history and physical exam performed.


Due to findings the following was ordered:





31-year-old -American female presents to the emergency room requesting 

treatment for STD exposure.  Patient states that her  informed her this 

he had contracted chlamydia and gonorrhea outside of their marriage.  Patient 

comes in for evaluation and treatment.  Patient denies any abdominal pain no 

vaginal discharge reports she is currently on her menses.  Denies any fever 

chills no nausea no vomiting or pelvic pain.





Discussed with patient to follow-up with her primary care provider or her OB/GYN

for screening and testing and treatment.





ED Disposition for MSE


Clinical Impression: 


 Concern about STD in female without diagnosis, Severely overweight





Disposition: Z-07 MED SCREENING EXAM-LEFT


Is pt being admited?: No


Does the pt Need Aspirin: No


Condition: Stable


Additional Instructions: 


Follow-up with your primary care provider or your OB/GYN for STD evaluation and 

treatment.  Refrain from intercourse until you are screened and treated.


Referrals: 


Manas Haddad or Dr. Lucio [Other] - 3-5 Days

## 2021-02-26 NOTE — EMERGENCY DEPARTMENT REPORT
ED Back Pain/Injury HPI





- General


Chief Complaint: Back Pain/Injury


Stated Complaint: BACK PAIN


Source: patient


Limitations: No Limitations





- History of Present Illness


Initial Comments: 





Patient is a 31-year-old -American female with a history of 

non-insulin-dependent diabetes, morbid obesity, asthma, chronic anemia, asthma 

and DVT who presents to the ED with acute onset persistent nontraumatic low back

pain for the last 3 days.  Patient states that she has been taking 

over-the-counter Tylenol for pain with no relief.  Patient states that the last 

time she took extra strength Tylenol worse 30 minutes prior to arrival in the 

ED.  Patient denies dysuria, urinary frequency and urgency, chest pain, 

shortness of breath, hematuria, traumatic injury, fall, heavy lifting, nausea 

and vomiting, abdominal pain, fever, chills, vaginal bleeding, saddle 

paresthesia, urinary retention, bowel incontinence, numbness and tingling or 

weakness of lower extremities bilaterally.


MD Complaint: back pain (LOWER)


-: Sudden, days(s) (3)


Similar Symptoms Previously: No


Place: home


Radiation: none


Severity: severe


Severity scale (0 -10): 8


Quality: sharp, aching


Consistency: constant


Improves With: none


Worsens With: movement, walking


Associated Symptoms: denies other symptoms.  denies: confusion, weakness, chest 

pain, numbness, difficulty walking, cough, difficulty urinating, diaphoresis, 

incontinence, fever/chills, constipation, headaches, abdominal pain, loss of 

appetite, malaise, rash, seizure, shortness of breath, syncope


Treatments Prior to Arrival: acetaminophen





- Related Data


                                Home Medications











 Medication  Instructions  Recorded  Confirmed  Last Taken


 


Insulin NPH Human Isophane 14 units SQ QHS 18 21:15





[Humulin N]    


 


Insulin Regular, Human [Humulin R] 10 units SQ QAMDIAB 18 08:30


 


Pnv No.95/Ferrous Fum/Folic AC 1 tab PO QDAY 18 08:00





[Prenatal Vitamins Tablet]    








                                  Previous Rx's











 Medication  Instructions  Recorded  Last Taken  Type


 


Albuterol Mdi (or & Nicu Only) 2 puff IH QID PRN #1 inhalation 02/15/17 4 Months

Ago Rx





[ProAir HFA Inhaler]   ~18 


 


Ferrous Sulfate [Feosol 325 MG tab] 325 mg PO BID #60 tablet 18 Unknown Rx


 


HYDROcodone/APAP 5-325 [[place] 1 each PO Q6HR PRN #30 tablet 18 Unknown Rx





5/325]    


 


Ibuprofen [Motrin] 800 mg PO Q8HR PRN #30 tablet 18 Unknown Rx


 


Pnv No.95/Ferrous Fum/Folic AC 1 each PO DAILY #30 tablet 18 Unknown Rx





[Prenavite Tablet]    


 


Sennosides/Docusate [Senokot S] 1 each PO Q12HR #14 tab 19 Unknown Rx


 


Apixaban [Eliquis starter pack] 5 mg PO BID #74 tab.ds.pk 20 Unknown Rx


 


Albuterol Mdi (or & Nicu Only) 2 puff IH Q4HR PRN #1 inhalation 20 Unknown

Rx





[ProAir HFA Inhaler]    


 


HYDROcodone/APAP 5-325 [[place] 1 each PO Q6HR PRN #12 tablet 20 Unknown Rx





5/325]    


 


Ibuprofen [Motrin] 800 mg PO Q8HR PRN #30 tablet 20 Unknown Rx


 


Ibuprofen [Motrin] 800 mg PO Q8HR PRN #30 tablet 10/07/20 Unknown Rx


 


Methocarbamol [Robaxin] 500 mg PO Q8H PRN #24 tablet 10/07/20 Unknown Rx


 


Baclofen 20 mg PO Q8H PRN #30 tablet 21 Unknown Rx


 


traMADoL [Ultram 50 MG tab] 50 mg PO Q6HR PRN #12 tablet 21 Unknown Rx











                                    Allergies











Allergy/AdvReac Type Severity Reaction Status Date / Time


 


codeine Allergy  Hives Verified 07/25/15 19:38














ED Review of Systems


ROS: 


Stated complaint: BACK PAIN


Other details as noted in HPI





Constitutional: denies: chills, fever


Eyes: denies: eye pain, eye discharge, vision change


ENT: denies: ear pain, throat pain


Respiratory: denies: cough, shortness of breath, wheezing


Cardiovascular: denies: chest pain, palpitations


Endocrine: no symptoms reported


Gastrointestinal: denies: abdominal pain, nausea, vomiting, diarrhea


Genitourinary: denies: urgency, dysuria, discharge


Musculoskeletal: back pain (Lower back pain), arthralgia.  denies: joint 

swelling


Skin: denies: rash, lesions


Neurological: denies: headache, weakness, paresthesias


Psychiatric: denies: anxiety, depression


Hematological/Lymphatic: denies: easy bleeding, easy bruising





ED Past Medical Hx





- Past Medical History


Previous Medical History?: Yes


Hx Hypertension: No


Hx Heart Attack/AMI: No


Hx Congestive Heart Failure: No


Hx Diabetes: Yes


Hx Deep Vein Thrombosis: Yes (right knee)


Hx Liver Disease: No


Hx Renal Disease: No


Hx Sickle Cell Disease: No


Hx Seizures: No


Hx Asthma: Yes (more seasonal)


Hx COPD: No


Hx HIV: No


Additional medical history: anemia





- Surgical History


Past Surgical History?: Yes


Additional Surgical History:  x2.  , Ectopic Pregnancy





- Social History


Smoking Status: Never Smoker


Substance Use Type: None





- Medications


Home Medications: 


                                Home Medications











 Medication  Instructions  Recorded  Confirmed  Last Taken  Type


 


Albuterol Mdi (or & Nicu Only) 2 puff IH QID PRN #1 inhalation 02/15/17 12/25/18

4 Months Ago Rx





[ProAir HFA Inhaler]    ~18 


 


Ferrous Sulfate [Feosol 325 MG tab] 325 mg PO BID #60 tablet 18  Unknown 

Rx


 


HYDROcodone/APAP 5-325 [[place] 1 each PO Q6HR PRN #30 tablet 18  Unknown Rx





5/325]     


 


Ibuprofen [Motrin] 800 mg PO Q8HR PRN #30 tablet 18  Unknown Rx


 


Insulin NPH Human Isophane 14 units SQ QHS 18 21:15 

History





[Humulin N]     


 


Insulin Regular, Human [Humulin R] 10 units SQ QAMDIAB 18 08:30 History


 


Pnv No.95/Ferrous Fum/Folic AC 1 tab PO QDAY 18 08:00 

History





[Prenatal Vitamins Tablet]     


 


Pnv No.95/Ferrous Fum/Folic AC 1 each PO DAILY #30 tablet 18  Unknown Rx





[Prenavite Tablet]     


 


Sennosides/Docusate [Senokot S] 1 each PO Q12HR #14 tab 19  Unknown Rx


 


Apixaban [Eliquis starter pack] 5 mg PO BID #74 tab.ds.pk 20  Unknown Rx


 


Albuterol Mdi (or & Nicu Only) 2 puff IH Q4HR PRN #1 inhalation 20  

Unknown Rx





[ProAir HFA Inhaler]     


 


HYDROcodone/APAP 5-325 [[place] 1 each PO Q6HR PRN #12 tablet 20  Unknown Rx





5/325]     


 


Ibuprofen [Motrin] 800 mg PO Q8HR PRN #30 tablet 20  Unknown Rx


 


Ibuprofen [Motrin] 800 mg PO Q8HR PRN #30 tablet 10/07/20  Unknown Rx


 


Methocarbamol [Robaxin] 500 mg PO Q8H PRN #24 tablet 10/07/20  Unknown Rx


 


Baclofen 20 mg PO Q8H PRN #30 tablet 21  Unknown Rx


 


traMADoL [Ultram 50 MG tab] 50 mg PO Q6HR PRN #12 tablet 21  Unknown Rx














ED Physical Exam





- General


Limitations: No Limitations


General appearance: alert, in no apparent distress





- Head


Head exam: Present: atraumatic, normocephalic, normal inspection





- Eye


Eye exam: Present: normal appearance, PERRL, EOMI


Pupils: Present: normal accommodation





- ENT


ENT exam: Present: normal exam, normal orophraynx, mucous membranes moist, TM's 

normal bilaterally, normal external ear exam





- Neck


Neck exam: Present: normal inspection, full ROM





- Respiratory


Respiratory exam: Present: normal lung sounds bilaterally.  Absent: respiratory 

distress, wheezes, rhonchi, chest wall tenderness, accessory muscle use, 

decreased breath sounds, prolonged expiratory





- Cardiovascular


Cardiovascular Exam: Present: regular rate, normal rhythm, normal heart sounds. 

Absent: systolic murmur, diastolic murmur, rubs, gallop





- GI/Abdominal


GI/Abdominal exam: Present: soft, normal bowel sounds.  Absent: tenderness, 

guarding, hyperactive bowel sounds, organomegaly





- Extremities Exam


Extremities exam: Present: normal inspection, full ROM, normal capillary refill





- Back Exam


Back exam: Present: normal inspection, full ROM, tenderness (Palpable 

lumbosacral paraspinal musculoskeletal tenderness), muscle spasm, paraspinal 

tenderness.  Absent: CVA tenderness (R), CVA tenderness (L), vertebral 

tenderness





- Neurological Exam


Neurological exam: Present: alert, oriented X3, CN II-XII intact, normal gait, 

reflexes normal





- Psychiatric


Psychiatric exam: Present: normal affect, normal mood





- Skin


Skin exam: Present: warm, dry, intact, normal color.  Absent: rash





ED Course





                                   Vital Signs











  21





  02:30


 


Temperature 98.6 F


 


Pulse Rate 86


 


Respiratory 18





Rate 


 


Blood Pressure 113/71





[Left] 


 


O2 Sat by Pulse 99





Oximetry 














ED Medical Decision Making





- Medical Decision Making





This is a 31-year-old -American female with a history of non

-insulin-dependent diabetes, morbid obesity, asthma, chronic anemia, asthma and 

DVT who presents to the ED with acute onset persistent nontraumatic low back 

pain for the last 3 days.  Patient states that she has been taking 

over-the-counter Tylenol for pain with no relief.  Patient states that the last 

time she took extra strength Tylenol worse 30 minutes prior to arrival in the 

ED. in the ED, patient is alert and oriented x3 and is not in any distress.  

Patient will discharge home on pain medications and muscle relaxants and advised

 to follow-up with her primary care physician in 5 to 7 days for reevaluation.  

Patient is advised return to the ED immediately if symptoms get worse.





- Differential Diagnosis


Muscle spasm; Muscle strain;


Critical care attestation.: 


If time is entered above; I have spent that time in minutes in the direct care 

of this critically ill patient, excluding procedure time.








ED Disposition


Clinical Impression: 


 Acute bilateral low back pain without sciatica, Spasm of muscle of lower back, 

Strain of muscle, fascia and tendon of lower back, initial encounter





Disposition: DC-01 TO HOME OR SELFCARE


Is pt being admited?: No


Does the pt Need Aspirin: No


Condition: Stable


Instructions:  Muscle Cramps and Spasms, Easy-to-Read, Muscle Strain, 

Easy-to-Read, Low Back Sprain or Strain Rehab-SportsMed


Additional Instructions: 


Patient history and physical exam findings, your symptoms are likely due to 

muscle strain or muscle spasm of your low back.  Since you have been ambulatory 

is unlikely that there is a fracture of your lower back bones since he did not 

fall or had any traumatic event.  Therefore take medications with food, drink 

plenty of fluids and follow-up with your primary care physician in 5 to 7 days 

for reevaluation.  Return to the ED immediately if symptoms get worse.


Prescriptions: 


Baclofen 20 mg PO Q8H PRN #30 tablet


 PRN Reason: Muscle Spasm


traMADoL [Ultram 50 MG tab] 50 mg PO Q6HR PRN #12 tablet


 PRN Reason: Pain


Referrals: 


[facility] [Provider Group] - 3-5 Days


Time of Disposition: 02:35


Print Language: ENGLISH

## 2021-03-04 NOTE — EMERGENCY DEPARTMENT REPORT
ED ENT HPI





- General


Chief complaint: Dental/Oral


Stated complaint: THONGE SWOLLEN


Time Seen by Provider: 21 14:44


Source: patient


Mode of arrival: Ambulatory


Limitations: No Limitations





- History of Present Illness


Initial comments: 


31-year-old female presents to the ER today complaint of painful bumps 

underneath her tongue.  She states that she noticed it a couple days ago.  She 

states that she thought she was having an allergic reaction has been taking 

Benadryl but not been helping.  She states that eating and drinking worsens the 

pain and therefore she has decreased intake of p.o. fluids and solids.  She 

denies any injury to the tongue.  She states that she has not been eating or 

drinking anything unusual.  She denies any throat pain or difficulty swallowing.

 She denies any drooling or difficulty opening her mouth.  She denies similar 

symptoms in the past





MD complaint: other (painful bumps on tongue)


-: days(s) (2)





- Related Data


                                Home Medications











 Medication  Instructions  Recorded  Confirmed  Last Taken


 


Insulin NPH Human Isophane 14 units SQ QHS 18 21:15





[Humulin N]    


 


Insulin Regular, Human [Humulin R] 10 units SQ QAMDIAB 18 08:30


 


Pnv No.95/Ferrous Fum/Folic AC 1 tab PO QDAY 18 08:00





[Prenatal Vitamins Tablet]    








                                  Previous Rx's











 Medication  Instructions  Recorded  Last Taken  Type


 


Albuterol Mdi (or & Nicu Only) 2 puff IH QID PRN #1 inhalation 02/15/17 4 Months

Ago Rx





[ProAir HFA Inhaler]   ~18 


 


Ferrous Sulfate [Feosol 325 MG tab] 325 mg PO BID #60 tablet 18 Unknown Rx


 


HYDROcodone/APAP 5-325 [[place] 1 each PO Q6HR PRN #30 tablet 18 Unknown Rx





5/325]    


 


Pnv No.95/Ferrous Fum/Folic AC 1 each PO DAILY #30 tablet 18 Unknown Rx





[Prenavite Tablet]    


 


Sennosides/Docusate [Senokot S] 1 each PO Q12HR #14 tab 19 Unknown Rx


 


Apixaban [Eliquis starter pack] 5 mg PO BID #74 tab.ds.pk 20 Unknown Rx


 


Albuterol Mdi (or & Nicu Only) 2 puff IH Q4HR PRN #1 inhalation 20 Unknown

Rx





[ProAir HFA Inhaler]    


 


HYDROcodone/APAP 5-325 [[place] 1 each PO Q6HR PRN #12 tablet 20 Unknown Rx





5/325]    


 


Methocarbamol [Robaxin] 500 mg PO Q8H PRN #24 tablet 10/07/20 Unknown Rx


 


Baclofen 20 mg PO Q8H PRN #30 tablet 21 Unknown Rx


 


traMADoL [Ultram 50 MG tab] 50 mg PO Q6HR PRN #12 tablet 21 Unknown Rx


 


Ibuprofen [Motrin 800 MG tab] 800 mg PO Q8HR PRN #30 tablet 21 Unknown Rx


 


Nystas/Diphen/Xyl Visc/Mylanta 10 - 15 ml MM Q4H PRN #160 ml 21 Unknown Rx





[Magic Mouthwash]    











                                    Allergies











Allergy/AdvReac Type Severity Reaction Status Date / Time


 


codeine Allergy  Hives Verified 21 14:45














ED Dental HPI





- General


Chief complaint: Eye Problems


Stated complaint: THONGE SWOLLEN


Time Seen by Provider: 21 14:44


Source: patient


Mode of arrival: Ambulatory


Limitations: No Limitations





- Related Data


                                Home Medications











 Medication  Instructions  Recorded  Confirmed  Last Taken


 


Insulin NPH Human Isophane 14 units SQ QHS 18 21:15





[Humulin N]    


 


Insulin Regular, Human [Humulin R] 10 units SQ QAMDIAB 18 08:30


 


Pnv No.95/Ferrous Fum/Folic AC 1 tab PO QDAY 18 08:00





[Prenatal Vitamins Tablet]    








                                  Previous Rx's











 Medication  Instructions  Recorded  Last Taken  Type


 


Albuterol Mdi (or & Nicu Only) 2 puff IH QID PRN #1 inhalation 02/15/17 4 Months

Ago Rx





[ProAir HFA Inhaler]   ~18 


 


Ferrous Sulfate [Feosol 325 MG tab] 325 mg PO BID #60 tablet 18 Unknown Rx


 


HYDROcodone/APAP 5-325 [[place] 1 each PO Q6HR PRN #30 tablet 18 Unknown Rx





5/325]    


 


Pnv No.95/Ferrous Fum/Folic AC 1 each PO DAILY #30 tablet 18 Unknown Rx





[Prenavite Tablet]    


 


Sennosides/Docusate [Senokot S] 1 each PO Q12HR #14 tab 19 Unknown Rx


 


Apixaban [Eliquis starter pack] 5 mg PO BID #74 tab.ds.pk 20 Unknown Rx


 


Albuterol Mdi (or & Nicu Only) 2 puff IH Q4HR PRN #1 inhalation 20 Unknown

Rx





[ProAir HFA Inhaler]    


 


HYDROcodone/APAP 5-325 [[place] 1 each PO Q6HR PRN #12 tablet 20 Unknown Rx





5/325]    


 


Methocarbamol [Robaxin] 500 mg PO Q8H PRN #24 tablet 10/07/20 Unknown Rx


 


Baclofen 20 mg PO Q8H PRN #30 tablet 21 Unknown Rx


 


traMADoL [Ultram 50 MG tab] 50 mg PO Q6HR PRN #12 tablet 21 Unknown Rx


 


Ibuprofen [Motrin 800 MG tab] 800 mg PO Q8HR PRN #30 tablet 21 Unknown Rx


 


Nystas/Diphen/Xyl Visc/Mylanta 10 - 15 ml MM Q4H PRN #160 ml 21 Unknown Rx





[Magic Mouthwash]    











                                    Allergies











Allergy/AdvReac Type Severity Reaction Status Date / Time


 


codeine Allergy  Hives Verified 21 14:45














ED Review of Systems


ROS: 


Stated complaint: THONGE SWOLLEN


Other details as noted in HPI





Comment: All other systems reviewed and negative


ENT: other (Painful bumps underneath some).  denies: ear pain, throat pain, 

dental pain, hearing loss, epistaxis, congestion


Respiratory: no symptoms reported


Cardiovascular: as per HPI


Skin: rash





ED Past Medical Hx





- Past Medical History


Hx Hypertension: No


Hx Heart Attack/AMI: No


Hx Congestive Heart Failure: No


Hx Diabetes: Yes


Hx Deep Vein Thrombosis: Yes (right knee)


Hx Liver Disease: No


Hx Renal Disease: No


Hx Sickle Cell Disease: No


Hx Seizures: No


Hx Asthma: Yes (more seasonal)


Hx COPD: No


Hx HIV: No


Additional medical history: anemia





- Surgical History


Additional Surgical History:  x2.  , Ectopic Pregnancy





- Social History


Smoking Status: Never Smoker


Substance Use Type: None





- Medications


Home Medications: 


                                Home Medications











 Medication  Instructions  Recorded  Confirmed  Last Taken  Type


 


Albuterol Mdi (or & Nicu Only) 2 puff IH QID PRN #1 inhalation 02/15/17 12/25/18

4 Months Ago Rx





[ProAir HFA Inhaler]    ~18 


 


Ferrous Sulfate [Feosol 325 MG tab] 325 mg PO BID #60 tablet 18  Unknown 

Rx


 


HYDROcodone/APAP 5-325 [[place] 1 each PO Q6HR PRN #30 tablet 18  Unknown Rx





5/325]     


 


Insulin NPH Human Isophane 14 units SQ QHS 18 21:15 

History





[Humulin N]     


 


Insulin Regular, Human [Humulin R] 10 units SQ QAMDIAB 18 08:30 History


 


Pnv No.95/Ferrous Fum/Folic AC 1 tab PO QDAY 18 08:00 

History





[Prenatal Vitamins Tablet]     


 


Pnv No.95/Ferrous Fum/Folic AC 1 each PO DAILY #30 tablet 18  Unknown Rx





[Prenavite Tablet]     


 


Sennosides/Docusate [Senokot S] 1 each PO Q12HR #14 tab 19  Unknown Rx


 


Apixaban [Eliquis starter pack] 5 mg PO BID #74 tab.ds.pk 20  Unknown Rx


 


Albuterol Mdi (or & Nicu Only) 2 puff IH Q4HR PRN #1 inhalation 20  

Unknown Rx





[ProAir HFA Inhaler]     


 


HYDROcodone/APAP 5-325 [[place] 1 each PO Q6HR PRN #12 tablet 20  Unknown Rx





5/325]     


 


Methocarbamol [Robaxin] 500 mg PO Q8H PRN #24 tablet 10/07/20  Unknown Rx


 


Baclofen 20 mg PO Q8H PRN #30 tablet 21  Unknown Rx


 


traMADoL [Ultram 50 MG tab] 50 mg PO Q6HR PRN #12 tablet 21  Unknown Rx


 


Ibuprofen [Motrin 800 MG tab] 800 mg PO Q8HR PRN #30 tablet 21  Unknown Rx


 


Nystas/Diphen/Xyl Visc/Mylanta 10 - 15 ml MM Q4H PRN #160 ml 21  Unknown 

Rx





[Magic Mouthwash]     














ED Physical Exam





- General


Limitations: No Limitations


General appearance: alert, in no apparent distress





- Head


Head exam: Present: normocephalic





- Eye


Eye exam: Present: normal appearance, PERRL, EOMI


Pupils: Present: normal accommodation





- ENT


ENT exam: Present: normal orophraynx, mucous membranes moist, other (About 2 

very small superficial ulcerated area noted underneath the tongue; there is also

a very small flesh-colored vesicular lesion underneath the tongue is also tender

to palpate; the tongue overall is not swollen.  No apparent salivary stone 

noted.  )





- Expanded ENT Exam


  ** Expanded


Mouth exam: Absent: drooling, muffled voice, tongue elevation, laceration


Throat exam: Positive: normal inspection





- Neck


Neck exam: Present: normal inspection, full ROM.  Absent: meningismus





- Respiratory


Respiratory exam: Present: normal lung sounds bilaterally.  Absent: respiratory 

distress





- Cardiovascular


Cardiovascular Exam: Present: regular rate





- Neurological Exam


Neurological exam: Present: alert, oriented X3





- Psychiatric


Psychiatric exam: Present: normal affect, normal mood





- Skin


Skin exam: Present: intact





ED Course


                                   Vital Signs











  21





  14:42 14:47 14:48


 


Temperature 98.1 F  


 


Pulse Rate  94 H 


 


Respiratory 20 20 





Rate   


 


Blood Pressure 99/75  


 


O2 Sat by Pulse   100





Oximetry   














ED Medical Decision Making





- Medical Decision Making


31-year-old female presents to the ER today complaint of painful bumps 

underneath her tongue.  She states that she noticed it a couple days ago.  She 

states that she thought she was having an allergic reaction has been taking 

Benadryl but not been helping.  She states that eating and drinking worsens the 

pain and therefore she has decreased intake of p.o. fluids and solids.  She 

denies any injury to the tongue.  She states that she has not been eating or 

drinking anything unusual.  She denies any throat pain or difficulty swallowing.

 She denies any drooling or difficulty opening her mouth.  She denies similar 

symptoms in the past. 





1513:


Physical exam shows very small superficial ulceration on the tongue.  No 

evidence of secondary bacterial infection.  No angioedema noted.  Patient is 

able to tolerate her secretions.  She has no drooling or trismus.  No 

respiratory distress or stridor.  Vital signs are stable.  She is well-

appearing, not toxic and appears well-hydrated.   Discussed suspected diagnosis 

and treatment plan with patient.  Patient expressed understanding of 

instructions and agree with plan.  Patient was stable at time of discharge.


Critical care attestation.: 


If time is entered above; I have spent that time in minutes in the direct care 

of this critically ill patient, excluding procedure time.








ED Disposition


Clinical Impression: 


 Tongue ulcer





Disposition: DC- TO HOME OR SELFCARE


Is pt being admited?: No


Does the pt Need Aspirin: No


Condition: Stable


Instructions:  Oral Ulcers


Additional Instructions: 


Take the motrin as prescribed. 


Prescriptions: 


Nystas/Diphen/Xyl Visc/Mylanta [Magic Mouthwash] 10 - 15 ml MM Q4H PRN #160 ml


 PRN Reason: Mouth Pain


Ibuprofen [Motrin 800 MG tab] 800 mg PO Q8HR PRN #30 tablet


 PRN Reason: Moder Pain Unrelieved By [place]


Referrals: 


PRIMARY CARE,MD [Primary Care Provider] - 3-5 Days


Time of Disposition: 15:06

## 2021-07-25 NOTE — EMERGENCY DEPARTMENT REPORT
ED Back Pain/Injury HPI





- General


Chief Complaint: Headache


Stated Complaint: MUSCLE SPASM BAD HEADACHE


Time Seen by Provider: 21 13:18


Source: patient


Limitations: No Limitations





- History of Present Illness


Initial Comments: 


31-year-old female with a past medical history of insulin-dependent diabetes, 

and asthma and tobacco use presents to the ER today with complaints of upper 

back pain which radiates down into her lower back and into her neck causing her 

to have an occipital headache.  She denies any particular injury or strenuous 

activity.  She states that the pain is worse with movement.  She states that she

spoke to her aunt who is an RN and told her she was having spasms.  She states 

that she tried an over-the-counter medication that I gave her but it did not 

help.  She also tried a muscle relaxer that her aunt gave her and it did not 

help.  Patient states that the pain has been constant but waxes and wanes.  She 

denies any pain into her chest or any isolated chest pain.  She denies any 

shortness of breath or wheezing.  She denies any abdominal pain, nausea, 

vomiting or diarrhea.  She denies any fever or chills.  She denies any bowel or 

bladder incontinence, urinary retention or constipation.  She denies any lower 

extremity weakness, saddle anesthesia, numbness or tingling.


MD Complaint: back pain, other (HA)


-: Gradual (3 days ago )





- Related Data


                                Home Medications











 Medication  Instructions  Recorded  Confirmed  Last Taken


 


Insulin NPH Human Isophane 14 units SQ QHS 18 21:15





[Humulin N]    


 


Insulin Regular, Human [Humulin R] 10 units SQ QAMDIAB 18 08:30


 


Pnv No.95/Ferrous Fum/Folic AC 1 tab PO QDAY 18 08:00





[Prenatal Vitamins Tablet]    








                                  Previous Rx's











 Medication  Instructions  Recorded  Last Taken  Type


 


Albuterol Mdi (or & Nicu Only) 2 puff IH QID PRN #1 inhalation 02/15/17 4 Months

Ago Rx





[ProAir HFA Inhaler]   ~18 


 


Ferrous Sulfate [Feosol 325 MG tab] 325 mg PO BID #60 tablet 18 Unknown Rx


 


HYDROcodone/APAP 5-325 [[place] 1 each PO Q6HR PRN #30 tablet 18 Unknown Rx





5/325]    


 


Pnv No.95/Ferrous Fum/Folic AC 1 each PO DAILY #30 tablet 18 Unknown Rx





[Prenavite Tablet]    


 


Sennosides/Docusate [Senokot S] 1 each PO Q12HR #14 tab 19 Unknown Rx


 


Apixaban [Eliquis starter pack] 5 mg PO BID #74 tab.ds.pk 20 Unknown Rx


 


Albuterol Mdi (or & Nicu Only) 2 puff IH Q4HR PRN #1 inhalation 20 Unknown

Rx





[ProAir HFA Inhaler]    


 


HYDROcodone/APAP 5-325 [[place] 1 each PO Q6HR PRN #12 tablet 20 Unknown Rx





5/325]    


 


Methocarbamol [Robaxin] 500 mg PO Q8H PRN #24 tablet 10/07/20 Unknown Rx


 


Baclofen 20 mg PO Q8H PRN #30 tablet 21 Unknown Rx


 


traMADoL [Ultram 50 MG tab] 50 mg PO Q6HR PRN #12 tablet 21 Unknown Rx


 


Ibuprofen [Motrin 800 MG tab] 800 mg PO Q8HR PRN #30 tablet 21 Unknown Rx


 


Nystas/Diphen/Xyl Visc/Mylanta 10 - 15 ml MM Q4H PRN #160 ml 21 Unknown Rx





[Magic Mouthwash]    











                                    Allergies











Allergy/AdvReac Type Severity Reaction Status Date / Time


 


codeine Allergy  Hives Verified 21 14:45


 


ketorolac [From Toradol] Allergy  Hives Verified 21 14:11














ED Review of Systems


ROS: 


Stated complaint: MUSCLE SPASM BAD HEADACHE


Other details as noted in HPI





Comment: All other systems reviewed and negative


Constitutional: denies: chills, fever


Eyes: denies: eye pain, eye discharge, vision change


ENT: denies: ear pain, throat pain


Respiratory: denies: cough, orthopnea, shortness of breath, SOB with exertion, 

SOB at rest, wheezing


Cardiovascular: denies: chest pain, palpitations, dyspnea on exertion, 

orthopnea, edema, syncope, paroxysmal nocturnal dyspnea


Endocrine: no symptoms reported


Gastrointestinal: denies: abdominal pain, nausea, vomiting, diarrhea, 

constipation, hematemesis, melena


Genitourinary: denies: urgency, dysuria, frequency, hematuria, discharge, 

abnormal menses, dyspareunia


Musculoskeletal: back pain.  denies: joint swelling, arthralgia


Neurological: headache.  denies: numbness, paresthesias, confusion, abnormal 

gait, vertigo


Psychiatric: denies: anxiety, depression, auditory hallucinations, visual 

hallucinations, homicidal thoughts, suicidal thoughts


Hematological/Lymphatic: denies: easy bleeding, easy bruising, swollen glands





ED Past Medical Hx





- Past Medical History


Hx Hypertension: No


Hx Heart Attack/AMI: No


Hx Congestive Heart Failure: No


Hx Diabetes: Yes


Hx Deep Vein Thrombosis: Yes (right knee)


Hx Liver Disease: No


Hx Renal Disease: No


Hx Sickle Cell Disease: No


Hx Seizures: No


Hx Asthma: Yes (more seasonal)


Hx COPD: No


Hx HIV: No


Additional medical history: anemia





- Surgical History


Additional Surgical History:  x2.  , Ectopic Pregnancy





- Social History


Smoking Status: Never Smoker


Substance Use Type: None





- Medications


Home Medications: 


                                Home Medications











 Medication  Instructions  Recorded  Confirmed  Last Taken  Type


 


Albuterol Mdi (or & Nicu Only) 2 puff IH QID PRN #1 inhalation 02/15/17 12/25/18

4 Months Ago Rx





[ProAir HFA Inhaler]    ~18 


 


Ferrous Sulfate [Feosol 325 MG tab] 325 mg PO BID #60 tablet 18  Unknown 

Rx


 


HYDROcodone/APAP 5-325 [[place] 1 each PO Q6HR PRN #30 tablet 18  Unknown Rx





5/325]     


 


Insulin NPH Human Isophane 14 units SQ QHS 18 21:15 

History





[Humulin N]     


 


Insulin Regular, Human [Humulin R] 10 units SQ QAMDIAB 18 08:30 History


 


Pnv No.95/Ferrous Fum/Folic AC 1 tab PO QDAY 18 08:00 

History





[Prenatal Vitamins Tablet]     


 


Pnv No.95/Ferrous Fum/Folic AC 1 each PO DAILY #30 tablet 18  Unknown Rx





[Prenavite Tablet]     


 


Sennosides/Docusate [Senokot S] 1 each PO Q12HR #14 tab 19  Unknown Rx


 


Apixaban [Eliquis starter pack] 5 mg PO BID #74 tab.ds.pk 20  Unknown Rx


 


Albuterol Mdi (or & Nicu Only) 2 puff IH Q4HR PRN #1 inhalation 20  

Unknown Rx





[ProAir HFA Inhaler]     


 


HYDROcodone/APAP 5-325 [[place] 1 each PO Q6HR PRN #12 tablet 20  Unknown Rx





5/325]     


 


Methocarbamol [Robaxin] 500 mg PO Q8H PRN #24 tablet 10/07/20  Unknown Rx


 


Baclofen 20 mg PO Q8H PRN #30 tablet 21  Unknown Rx


 


traMADoL [Ultram 50 MG tab] 50 mg PO Q6HR PRN #12 tablet 21  Unknown Rx


 


Ibuprofen [Motrin 800 MG tab] 800 mg PO Q8HR PRN #30 tablet 21  Unknown Rx


 


Nystas/Diphen/Xyl Visc/Mylanta 10 - 15 ml MM Q4H PRN #160 ml 21  Unknown 

Rx





[Magic Mouthwash]     














ED Physical Exam





- General


Limitations: No Limitations


General appearance: alert, in distress (Patient appears uncomfortable and 

appears to be in some pain from her thoracic back)





- Head


Head exam: Present: atraumatic, normocephalic, normal inspection





- Eye


Eye exam: Present: normal appearance, PERRL, EOMI


Pupils: Present: normal accommodation





- ENT


ENT exam: Present: normal exam, mucous membranes moist, TM's normal bilaterally





- Neck


Neck exam: Present: normal inspection, tenderness (ttp right paraspinal muscle 

), full ROM.  Absent: meningismus





- Respiratory


Respiratory exam: Present: normal lung sounds bilaterally.  Absent: respiratory 

distress, wheezes, rales, rhonchi





- Cardiovascular


Cardiovascular Exam: Present: regular rate, normal rhythm, normal heart sounds





- GI/Abdominal


GI/Abdominal exam: Present: soft.  Absent: distended, tenderness, guarding, 

rebound





- Back Exam


Back exam: Present: normal inspection, paraspinal tenderness (Patient has 

diffuse tenderness to palpation bilateral mid to lower paraspinal muscles in the

thoracic and lumbar areas), vertebral tenderness (Diffuse midline tenderness mid

to lower thoracic spine and upper middle lower lumbar spine.), other (Range of 

motion of the spine mildly reduced due to pain.).  Absent: CVA tenderness (R), 

CVA tenderness (L)





- Neurological Exam


Neurological exam: Present: alert, oriented X3, CN II-XII intact, normal gait





- Psychiatric


Psychiatric exam: Present: normal affect, normal mood





- Skin


Skin exam: Present: intact





ED Course


                                   Vital Signs











  21





  09:56


 


Temperature 98.3 F


 


Pulse Rate 81


 


Respiratory 18





Rate 


 


Blood Pressure 107/71


 


O2 Sat by Pulse 95





Oximetry 














ED Medical Decision Making





- Lab Data


Result diagrams: 


                                 21 13:51





                                 21 13:51





- Medical Decision Making


1550: I was told by the nurse that patient left.  She came out the room and 

stated that she could not wait for any more tests and that she had to go to do 

something with her boyfriend and walked out.  Nurse reports that patient walked 

out before they were able to get a chance to get her to sign AMA.  I was not 

told about this until after the patient left.





Critical care attestation.: 


If time is entered above; I have spent that time in minutes in the direct care 

of this critically ill patient, excluding procedure time.








ED Disposition


Clinical Impression: 


 Thoracic back pain, Headache





Disposition:  ELOPED


Is pt being admited?: No


Condition: Stable


Referrals: 


PRIMARY CARE,MD [Primary Care Provider] - 3-5 Days